# Patient Record
Sex: MALE | Race: WHITE | Employment: FULL TIME | URBAN - METROPOLITAN AREA
[De-identification: names, ages, dates, MRNs, and addresses within clinical notes are randomized per-mention and may not be internally consistent; named-entity substitution may affect disease eponyms.]

---

## 2019-05-02 ENCOUNTER — OFFICE VISIT (OUTPATIENT)
Dept: NEUROLOGY | Age: 44
End: 2019-05-02

## 2019-05-02 DIAGNOSIS — R41.840 INATTENTION: ICD-10-CM

## 2019-05-02 DIAGNOSIS — F90.0 ATTENTION DEFICIT HYPERACTIVITY DISORDER (ADHD), INATTENTIVE TYPE, MODERATE: ICD-10-CM

## 2019-05-02 DIAGNOSIS — F41.9 ANXIETY AND DEPRESSION: Primary | ICD-10-CM

## 2019-05-02 DIAGNOSIS — F41.9 MODERATE ANXIETY: Primary | ICD-10-CM

## 2019-05-02 DIAGNOSIS — F32.A ANXIETY AND DEPRESSION: Primary | ICD-10-CM

## 2019-05-02 DIAGNOSIS — F43.21 ADJUSTMENT DISORDER WITH DEPRESSED MOOD: ICD-10-CM

## 2019-05-02 NOTE — PROGRESS NOTES
[address] [facility],5Th Floor  [address]   [place] [address] [facility], [facility] [address]   [phone] Office   [phone] Fax      Neuropsychology    Initial Diagnostic Interview Note      Referral:  Carmen Holm MD    Dannie Eller is a 41 y.o. right handed   male who was unaccompanied to the initial clinical interview on 5/2/19 . Please refer to his medical records for details pertaining to his history. Briefly, the patient reported that he completed college without history of previously diagnosed LD and/or receipt of special education services. He has struggled cognitively in school early on without formal diagnosis. Graduated from [employer] with a 3.0. 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 He is here on referral from his therapist.  Keisha Ortiz and saw her in the fall of 2017 for depression and anxiety. He was on sertraline. He has had a sleep study done. See records. Stress at work, home, not finding happiness or milady in things previously enjoyed. Hard to stay focused. Difficulties concentrating. Procrastinates and does well when he is up against a deadline. He works from home which is a blessing and a curse. Has three kids, 5, 7 and almost 3. Stays pretty busy helping with the kids. Will think of something he wants to read or check out, leaves it open, and has 54 open pages on his phone like this. Questions have been raised regarding ADD/ADHD type concerns. Neurologic history is negative. He is easily distracted. Irritable when there is a lot of noise. Difficulties multtasking. He has to withdraw to be where it is quiet to get work done. He has always had these issues, in retrospect. He has had mood concerns also. He has three sisters as well. He does have a good life and doesn't understand why he feels this way.   He is currently on 125 mg of sertraline and medication for reflux. Their 9year old is extremely active, and wondering about ADHD issues. Has a large family, has 48 first cousins. Doesn't know his family history from a mood or cognitive standpoint. No previous neuropsych. Neuropsychological Mental Status Exam (NMSE):  Historian: Good  Praxis: No UE apraxia  R/L Orientation: Intact to self and to other  Dress: within normal limits   Weight: within normal limits   Appearance/Hygiene: within normal limits   Gait: within normal limits   Assistive Devices: None here (glasses when reading computers)   Mood: within normal limits   Affect: within normal limits   Comprehension: within normal limits   Thought Process: within normal limits   Expressive Language: within normal limits   Receptive Language: within normal limits   Motor:  No cognitive or motor perseveration  ETOH: Occasionally  Tobacco: Denied  Illicit: Denied  SI/HI: Denied  Psychosis: Denied  Insight: Within normal limits  Judgment: Within normal limits  Other Psych:    Medical history, family history, surgical history, allergies lists forms reviewed and in chart. Plan:  Obtain authorization for testing from insurance company. Report to follow once testing, scoring, and interpretation completed. ? Organic based neurocognitive issues versus mood disorder or combination of same. ? Problems organic, functional, or both? This note will not be viewable in [address] [address] x 1 Initial psych diagnostic interview patient with? Of organic based cognitive concerns versus mood (organic versus functional) or combination of same.

## 2019-05-03 NOTE — PROGRESS NOTES
[address] [facility],5Th Floor  [address]   [place] [address] [place] [address], [facility] [address]   [phone] Office   [phone] Fax      Psychological Evaluation Report  Referral:  Prieto Sinclair MD    Nidhi Caballero is a 41 y.o. right handed   male who was unaccompanied to the initial clinical interview on 5/2/19 . Please refer to his medical records for details pertaining to his history. Briefly, the patient reported that he completed college without history of previously diagnosed LD and/or receipt of special education services. He has struggled cognitively in school early on without formal diagnosis. Graduated from [employer] with a 3.0. 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 He is here on referral from his therapist.  Carlo Gorman and saw her in the fall of 2017 for depression and anxiety. He was on sertraline. He has had a sleep study done. See records. Stress at work, home, not finding happiness or milady in things previously enjoyed. Hard to stay focused. Difficulties concentrating. Procrastinates and does well when he is up against a deadline. He works from home which is a blessing and a curse. Has three kids, 5, 7 and almost 3. Stays pretty busy helping with the kids. Will think of something he wants to read or check out, leaves it open, and has 54 open pages on his phone like this. Questions have been raised regarding ADD/ADHD type concerns. Neurologic history is negative. He is easily distracted. Irritable when there is a lot of noise. Difficulties multtasking. He has to withdraw to be where it is quiet to get work done. He has always had these issues, in retrospect. He has had mood concerns also. He has three sisters as well. He does have a good life and doesn't understand why he feels this way. He is currently on 125 mg of sertraline and medication for reflux.   Their 9year old is extremely active, and wondering about ADHD issues. Has a large family, has 48 first cousins. Doesn't know his family history from a mood or cognitive standpoint. No previous neuropsych. Neuropsychological Mental Status Exam (NMSE):  Historian: Good  Praxis: No UE apraxia  R/L Orientation: Intact to self and to other  Dress: within normal limits   Weight: within normal limits   Appearance/Hygiene: within normal limits   Gait: within normal limits   Assistive Devices: None here (glasses when reading computers)   Mood: within normal limits   Affect: within normal limits   Comprehension: within normal limits   Thought Process: within normal limits   Expressive Language: within normal limits   Receptive Language: within normal limits   Motor:  No cognitive or motor perseveration  ETOH: Occasionally  Tobacco: Denied  Illicit: Denied  SI/HI: Denied  Psychosis: Denied  Insight: Within normal limits  Judgment: Within normal limits  Other Psych:    Medical history, family history, surgical history, allergies lists forms reviewed and in chart. Plan:  Obtain authorization for testing from insurance company. Report to follow once testing, scoring, and interpretation completed. ? Organic based neurocognitive issues versus mood disorder or combination of same. ? Problems organic, functional, or both? This note will not be viewable in [address] [address] x 1 Initial psych diagnostic interview patient with? Of organic based cognitive concerns versus mood (organic versus functional) or combination of same.       Psychological Evaluation  Patient Testing 5/2/19 Report Completed 5/3/19  A Psychometrist Assisted w/ portions of this evaluation while under my direct  supervision    Neuropsychologist Administered, Interpreted, & Reported: Neuropsychological Mental Status Exam, Revised Memory & Behavior Checklist, MMSE, Clock Drawing, Test Of Premorbid Functioning, Simona Yousif Adult ADD Scales, Michael-Melzack Pain Questionnaire, CPI-II, WILLIS, Sushil Complex Figure, History Taking  & Clinical Interview With The Patient*, Review Of Available Records*. Psychometrist Administered & Neuropsychologist Interpreted & Neuropsychologist Reported: Speech-Sounds Perception Test, [place] Rhythm Test, Paced Serial Addition Test, Wechsler Adult Intelligence Scale - IV, Verbal Fluency Tests, [facility] - Revised, Trailmaking Test Parts A & B, Buschke Selective Reminding Test,  Damon Depression Inventory - II, Damon Anxiety Inventory. Test Findings:  Note:  The patients raw data have been compared with currently available norms which include demographic corrections for age, gender, and/or education. Sometimes, the patients scores are compared to demographically similar individuals as close to the patients age, education level, etc., as possible. \"Average\" is viewed as being +/- 1 standard deviation (SD) from the stated mean for a particular test score. \"Low average\" is viewed as being between 1 and 2 SD below the mean, and above average is viewed as being 1 and 2 SD above the mean. Scores falling in the borderline range (between 1-1/2 and 2 SD below the mean) are viewed with particular attention as to whether they are normal or abnormal neurocognitive test scores. Other methods of inference in analyzing the test data are also utilized, including the pattern and range of scores in the profile, bilateral motor functions, and the presence, if any, of pathognomonic signs. Behaviorally, the patient was friendly and cooperative and appeared motivated to perform well during this examination. Within this context, the results of this evaluation are viewed as a valid reflection of the patients actual neurocognitive and emotional status. The patient's self-reported score of 86 on the Katherine Velasquez Adult ADD Scales was within the elevated risk range for ADHD related concerns.        His structured word list fluency, as assessed by the FAS Test, was within the average range with a T score of 49. Category fluency was within the average range with a T Score of 52. Confrontation naming, as assessed by the [facility] Test - Revised, was within the above average range at 60/60 correct (T = 65). This pattern of performance is not indicative of a patient who is at increased risk for day-to-day problems with verbal fluency and/or confrontation naming. The patient was administered the Daniel' Continuous Performance Test-III and review of the subscales within this instrument revealed mild to moderate concern for inattentiveness without concern for impulsivity. Verbal auditory attention (T = 48) and nonverbal auditory attention and discrimination (T = 29) were both within the normal range. High level auditory information processing speed, as assessed by the PASAT, was within the normal range for Trial 2 (- 0.244 SD). This pattern of performance is indicative of a patient who is at increased risk for day-to-day problems with sustained visual attention. Auditory attention and discrimination related abilities and high level auditory information processing speed related abilities were normal.  .       The patient was administered the Wechsler Adult Intelligence Scale - IV. See Appendix I (in media section of this EMR) for full breakdown of IQ scores. There was a clinically significant difference between his average range Working Memory Index score of 102 (55th %ile) and his superior range Processing Speed Index score of 127 (96th  %ile). His Verbal Comprehension Index score of 108 (70th %ile) was within the normal range. His Perceptual Reasoning Index score of 94 (34th %ile) was also average. This pattern of performance is not indicative of a patient who is at increased risk for day-to-day problems with working memory and/or processing speed.   Verbal comprehension and perceptual reasoning abilities are normal.  Scores are commensurate with or higher than expected based on his performance on a task assessing premorbid functioning levels. IQ scores are within the superior to average range and thus no full scale score is reported. The PSI should be used as the most accurate indicator of his actual intellectual acumen. The patient was administered the Buschke Selective Reminding Test and his basic learning and memory (108/144) was normal.  In this regard, his consistency related long term retrieval was severely impaired (38/144 correct), especially when compared to his normal range Long Term Storage (107/144). His discrepancy score (+ 69 points) is clinically significant and is suggestive of a high level cognitive organization problem and/or high level attention concern. Otherwise, his auditory learning and memory abilities are normal.  Visual learning and memory abilities were also normal on the Sushil Complex Figure Test.       Simple timed visual motor sequencing (Trailmaking Test Part A) was within the above average range with a T score of 63. The patient's performance on a similar, but more complex task of timed visual motor sequencing (Trailmaking Test Part B) was within the average range with a T score of 51. The patient made zero sequencing errors on this latter test.  Taken together, this pattern of performance is not indicative of a patient who is at increased risk for day-to-day problems with frontal lobe-mediated executive functioning abilities. The patient rated his current level of pain as \"2/5- Discomforting\" on the Michael-Melzack Pain Questionnaire. He reported pain in his neck, left shoulder, right lower back, and right calf. His Damon Depression Inventory - II score of 27 was within the moderately depressed range. His Damon Anxiety Inventory score of 18 reflected moderate anxiety.        The patient was also administered the Personality Assessment Inventory and generated a valid profile for interpretation. Within this context,, marked depression is present. He is likely to be plagued by thoughts of worthlessness and hopelessness, and personal failure. Furthermore, he has considerable anxiety. He has problems in the area of obsessional and/or compulsive thoughts and behaviors. He is probably seen by others as somewhat of a perfectionist. He is likely to be a fairly rigid individual who follows his personal guidelines in an inflexible and unyielding manner. He ruminates about matters to the degree whereby he often has difficulties making decisions. Changes in routine and unexpected events may cause untoward stress. He is worried and anxious in general to the degree whereby his focus and attention are both further compromised. He is socially isolated, and has difficulties interpreting the normal nuances of interpersonal behavior that provide meaning to relationships. He is more wary and sensitive in his interactions than the average adult. Self-concept is quite harsh and negative. He is inwardly much more troubled by self-doubt and misgivings about his adequacy than readily apparent to others. HE tends to be cold and unfeeling at times. He is easily angered, and may be seen as having a hostile, angry temperament. While he reports his level of treatment motivation as somewhat low, he possesses a number of important personality strengths that augur well for a smooth treatment process and a reasonably good outcome. He may have difficulties with emotional constriction and difficulties with the expression of emotional material at times, and may have difficulties establishing trust with his treating provider and any impasse may need to be handled with particular care. Impressions and Recommendations:  From the actual neurocognitive profile, there is support for a \"high functioning\" ADHD- Inattentive type problem.   He is also showing problems with high level cognitive organization abilities. These issues have been and continue to be significantly masked by his superior range IQ. This issue is commonly missed in gifted individuals, and often seen as causing additional problems when they are adults. Additionally, anxiety is very common in the gifted population, and the depression is secondary and stems from the combination of anxiety, high IQ, and ADHD- Inattentive type concerns. His personality profile also shows some mildly autistic tendencies which is also commonly seen in gifted adults, and there are some OCD type behavioral and personality patterns as well. These are not significant to the degree whereby diagnoses of same are warranted, but addressing those issues should be incorporated into his treatment plan. Anxiety and ADHD issues come first, and the depression appears secondary to same. In addition to continued medical care and psychotherapy, I recommend consideration for concurrent management of anxiety (with a daily medication such as Buspar, for example) if not medically contraindicated along with consideration for an appropriate medication for attention if not medically contraindicated. Caution is advised in selecting an appropriate medication for attention for him, given the anxiety. Continue active engagement in psychotherapy, and a combination of CBT and psychodynamic therapies may prove helpful for him. Organizational skills training may also prove helpful. With treatment, his prognosis is quite good. Baseline now established. Follow up prn. Clinical correlation is, of course, indicated. 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   If there is any additional information of which I am currently unaware, I would be more than happy to review it upon having it made available to me. Thank you for the opportunity to see this interesting individual.     Sincerely,       Brenda Bautista. Angie Amaro, EdS,LCP    Appendix: IQ Test results (see media section of this EMR)    Cc: Tanya Coe MD , [facility]    Time Documentation:    88131 x 1 Diagnostic Interview,  already billed on first date of service)    23303 x 1 04689*2  Test administration/data gathering by Neuropsychologist (see above), 60 minutes    96138 x 1 Test administration, data gathering by technician (1st 30 minutes), 30 minutes  96139 x 5 Test administration, data gathering by technician (each additional 30 minutes), 3 hours (total tech 3 hours)   96130 x 1 Testing Evaluation Services By Neuropsychologist, 1st hour  06997 x 1 Testing Evaluation Services by Neuropsychologist, 2nd hour (45 minutes)  This includes review of referral question, reviewing records, planning test battery (50 minutes prior to testing date), and interpreting data (30 minutes), and interpretation and report writing (50 minutes)       Anticipated Integrated Feedback (69954) - Service to be completed on a future date and not currently billed. The above includes: Record review. Review of history provided by patient. Review of collaborative information. Testing by Clinician. Review of raw data. Scoring. Report writing of individual tests administered by Clinician. Integration of individual tests administered by psychometrist with NSE/testing by clinician, review of records/history/collaborative information, case Conceptualization, treatment planning, clinical decision making, report writing, coordination Of Care.  Psychometry test codes as time spent by psychometrist administering and scoring neurocognitive/psychological tests under supervision of neuropsychologist.  Integral services including scoring of raw data, data interpretation, case conceptualization, report writing etcetera were initiated after the patient finished testing/raw data collected and was completed on the date the report was signed.

## 2024-11-21 ENCOUNTER — APPOINTMENT (OUTPATIENT)
Facility: HOSPITAL | Age: 49
End: 2024-11-21
Payer: COMMERCIAL

## 2024-11-21 ENCOUNTER — HOSPITAL ENCOUNTER (EMERGENCY)
Facility: HOSPITAL | Age: 49
Discharge: HOME | End: 2024-11-21
Attending: EMERGENCY MEDICINE
Payer: COMMERCIAL

## 2024-11-21 VITALS
DIASTOLIC BLOOD PRESSURE: 85 MMHG | SYSTOLIC BLOOD PRESSURE: 126 MMHG | HEIGHT: 70 IN | RESPIRATION RATE: 18 BRPM | HEART RATE: 71 BPM | BODY MASS INDEX: 28.03 KG/M2 | OXYGEN SATURATION: 99 % | WEIGHT: 195.77 LBS | TEMPERATURE: 98 F

## 2024-11-21 DIAGNOSIS — K85.90 ACUTE PANCREATITIS, UNSPECIFIED COMPLICATION STATUS, UNSPECIFIED PANCREATITIS TYPE: ICD-10-CM

## 2024-11-21 DIAGNOSIS — N43.3 HYDROCELE, UNSPECIFIED HYDROCELE TYPE: Primary | ICD-10-CM

## 2024-11-21 DIAGNOSIS — K29.80 DUODENITIS: ICD-10-CM

## 2024-11-21 LAB
ALBUMIN SERPL-MCNC: 4.5 G/DL (ref 3.5–5)
ALBUMIN/GLOB SERPL: 1.1 (ref 1.1–2.2)
ALP SERPL-CCNC: 115 U/L (ref 45–117)
ALT SERPL-CCNC: 38 U/L (ref 12–78)
ANION GAP SERPL CALC-SCNC: 7 MMOL/L (ref 2–12)
APPEARANCE UR: CLEAR
AST SERPL-CCNC: 23 U/L (ref 15–37)
BACTERIA URNS QL MICRO: NEGATIVE /HPF
BASOPHILS # BLD: 0.1 K/UL (ref 0–0.1)
BASOPHILS NFR BLD: 1 % (ref 0–1)
BILIRUB SERPL-MCNC: 0.6 MG/DL (ref 0.2–1)
BILIRUB UR QL: NEGATIVE
BUN SERPL-MCNC: 13 MG/DL (ref 6–20)
BUN/CREAT SERPL: 10 (ref 12–20)
CALCIUM SERPL-MCNC: 9.3 MG/DL (ref 8.5–10.1)
CHLORIDE SERPL-SCNC: 103 MMOL/L (ref 97–108)
CO2 SERPL-SCNC: 27 MMOL/L (ref 21–32)
COLOR UR: ABNORMAL
CREAT SERPL-MCNC: 1.3 MG/DL (ref 0.7–1.3)
DIFFERENTIAL METHOD BLD: ABNORMAL
EOSINOPHIL # BLD: 0.3 K/UL (ref 0–0.4)
EOSINOPHIL NFR BLD: 2 % (ref 0–7)
EPITH CASTS URNS QL MICRO: ABNORMAL /LPF
ERYTHROCYTE [DISTWIDTH] IN BLOOD BY AUTOMATED COUNT: 12.4 % (ref 11.5–14.5)
GLOBULIN SER CALC-MCNC: 4.2 G/DL (ref 2–4)
GLUCOSE SERPL-MCNC: 102 MG/DL (ref 65–100)
GLUCOSE UR STRIP.AUTO-MCNC: NEGATIVE MG/DL
HCT VFR BLD AUTO: 47.5 % (ref 36.6–50.3)
HGB BLD-MCNC: 15.6 G/DL (ref 12.1–17)
HGB UR QL STRIP: NEGATIVE
HYALINE CASTS URNS QL MICRO: ABNORMAL /LPF (ref 0–2)
IMM GRANULOCYTES # BLD AUTO: 0 K/UL (ref 0–0.04)
IMM GRANULOCYTES NFR BLD AUTO: 0 % (ref 0–0.5)
KETONES UR QL STRIP.AUTO: 15 MG/DL
LEUKOCYTE ESTERASE UR QL STRIP.AUTO: NEGATIVE
LIPASE SERPL-CCNC: 360 U/L (ref 13–75)
LYMPHOCYTES # BLD: 2.1 K/UL (ref 0.8–3.5)
LYMPHOCYTES NFR BLD: 17 % (ref 12–49)
MCH RBC QN AUTO: 27.6 PG (ref 26–34)
MCHC RBC AUTO-ENTMCNC: 32.8 G/DL (ref 30–36.5)
MCV RBC AUTO: 83.9 FL (ref 80–99)
MONOCYTES # BLD: 0.8 K/UL (ref 0–1)
MONOCYTES NFR BLD: 6 % (ref 5–13)
NEUTS SEG # BLD: 9.2 K/UL (ref 1.8–8)
NEUTS SEG NFR BLD: 74 % (ref 32–75)
NITRITE UR QL STRIP.AUTO: NEGATIVE
NRBC # BLD: 0 K/UL (ref 0–0.01)
NRBC BLD-RTO: 0 PER 100 WBC
PH UR STRIP: 5.5 (ref 5–8)
PLATELET # BLD AUTO: 333 K/UL (ref 150–400)
PMV BLD AUTO: 8.3 FL (ref 8.9–12.9)
POTASSIUM SERPL-SCNC: 4 MMOL/L (ref 3.5–5.1)
PROT SERPL-MCNC: 8.7 G/DL (ref 6.4–8.2)
PROT UR STRIP-MCNC: NEGATIVE MG/DL
RBC # BLD AUTO: 5.66 M/UL (ref 4.1–5.7)
RBC #/AREA URNS HPF: ABNORMAL /HPF (ref 0–5)
SODIUM SERPL-SCNC: 137 MMOL/L (ref 136–145)
SP GR UR REFRACTOMETRY: 1.01 (ref 1–1.03)
UROBILINOGEN UR QL STRIP.AUTO: 1 EU/DL (ref 0.2–1)
WBC # BLD AUTO: 12.5 K/UL (ref 4.1–11.1)
WBC URNS QL MICRO: ABNORMAL /HPF (ref 0–4)

## 2024-11-21 PROCEDURE — 74177 CT ABD & PELVIS W/CONTRAST: CPT

## 2024-11-21 PROCEDURE — 6360000004 HC RX CONTRAST MEDICATION: Performed by: EMERGENCY MEDICINE

## 2024-11-21 PROCEDURE — 94761 N-INVAS EAR/PLS OXIMETRY MLT: CPT

## 2024-11-21 PROCEDURE — 83690 ASSAY OF LIPASE: CPT

## 2024-11-21 PROCEDURE — 36415 COLL VENOUS BLD VENIPUNCTURE: CPT

## 2024-11-21 PROCEDURE — 85025 COMPLETE CBC W/AUTO DIFF WBC: CPT

## 2024-11-21 PROCEDURE — 81001 URINALYSIS AUTO W/SCOPE: CPT

## 2024-11-21 PROCEDURE — 99285 EMERGENCY DEPT VISIT HI MDM: CPT

## 2024-11-21 PROCEDURE — 80053 COMPREHEN METABOLIC PANEL: CPT

## 2024-11-21 PROCEDURE — 76870 US EXAM SCROTUM: CPT

## 2024-11-21 RX ORDER — IOPAMIDOL 755 MG/ML
100 INJECTION, SOLUTION INTRAVASCULAR
Status: COMPLETED | OUTPATIENT
Start: 2024-11-21 | End: 2024-11-21

## 2024-11-21 RX ADMIN — IOPAMIDOL 100 ML: 755 INJECTION, SOLUTION INTRAVENOUS at 15:22

## 2024-11-21 ASSESSMENT — ENCOUNTER SYMPTOMS
NAUSEA: 1
DIARRHEA: 0
ABDOMINAL PAIN: 1
ABDOMINAL DISTENTION: 0
COUGH: 0
VOMITING: 0
SHORTNESS OF BREATH: 0
WHEEZING: 0
BLOOD IN STOOL: 0
ANAL BLEEDING: 0

## 2024-11-21 ASSESSMENT — LIFESTYLE VARIABLES
HOW MANY STANDARD DRINKS CONTAINING ALCOHOL DO YOU HAVE ON A TYPICAL DAY: PATIENT DOES NOT DRINK
HOW OFTEN DO YOU HAVE A DRINK CONTAINING ALCOHOL: NEVER

## 2024-11-21 ASSESSMENT — PAIN - FUNCTIONAL ASSESSMENT: PAIN_FUNCTIONAL_ASSESSMENT: 0-10

## 2024-11-21 ASSESSMENT — PAIN DESCRIPTION - LOCATION: LOCATION: ABDOMEN;BACK

## 2024-11-21 ASSESSMENT — PAIN SCALES - GENERAL: PAINLEVEL_OUTOF10: 7

## 2024-11-21 ASSESSMENT — PAIN DESCRIPTION - ORIENTATION: ORIENTATION: RIGHT

## 2024-11-21 NOTE — ED TRIAGE NOTES
Patient to ER for complaints of intermittent abdominal pain more on the RLQ. Endorses lower back pain.     Endorses nausea.     Denies urinary or bowel changes.     Denies any abdominal surgeries.     Hx of HDL, GERD, and hiatal hernia.